# Patient Record
Sex: MALE | Race: WHITE | NOT HISPANIC OR LATINO
[De-identification: names, ages, dates, MRNs, and addresses within clinical notes are randomized per-mention and may not be internally consistent; named-entity substitution may affect disease eponyms.]

---

## 2019-03-07 ENCOUNTER — APPOINTMENT (OUTPATIENT)
Dept: OTOLARYNGOLOGY | Facility: CLINIC | Age: 19
End: 2019-03-07
Payer: COMMERCIAL

## 2019-03-07 VITALS
BODY MASS INDEX: 21.67 KG/M2 | SYSTOLIC BLOOD PRESSURE: 124 MMHG | HEART RATE: 73 BPM | HEIGHT: 72 IN | WEIGHT: 160 LBS | DIASTOLIC BLOOD PRESSURE: 69 MMHG

## 2019-03-07 DIAGNOSIS — J34.9 UNSPECIFIED DISORDER OF NOSE AND NASAL SINUSES: ICD-10-CM

## 2019-03-07 PROCEDURE — 31231 NASAL ENDOSCOPY DX: CPT

## 2019-03-07 RX ORDER — AZELASTINE HYDROCHLORIDE AND FLUTICASONE PROPIONATE 137; 50 UG/1; UG/1
137-50 SPRAY, METERED NASAL TWICE DAILY
Qty: 1 | Refills: 4 | Status: ACTIVE | COMMUNITY
Start: 2019-03-07 | End: 1900-01-01

## 2019-03-07 NOTE — HISTORY OF PRESENT ILLNESS
[Sinus] : sinus surgery [de-identified] : MARLENY GAINES is a 18 year man with a history of CRS and LPR about 2 months s/p septoplasty and bilateral FESS. He complains of intermittent nasal congestion.\par